# Patient Record
Sex: MALE | Race: WHITE | URBAN - METROPOLITAN AREA
[De-identification: names, ages, dates, MRNs, and addresses within clinical notes are randomized per-mention and may not be internally consistent; named-entity substitution may affect disease eponyms.]

---

## 2018-07-20 ENCOUNTER — OFFICE VISIT (OUTPATIENT)
Dept: URGENT CARE | Facility: URGENT CARE | Age: 53
End: 2018-07-20
Payer: COMMERCIAL

## 2018-07-20 VITALS
OXYGEN SATURATION: 98 % | WEIGHT: 219.9 LBS | TEMPERATURE: 98 F | DIASTOLIC BLOOD PRESSURE: 78 MMHG | SYSTOLIC BLOOD PRESSURE: 122 MMHG | HEART RATE: 72 BPM

## 2018-07-20 DIAGNOSIS — R07.89 CHEST DISCOMFORT: Primary | ICD-10-CM

## 2018-07-20 PROCEDURE — 99213 OFFICE O/P EST LOW 20 MIN: CPT | Performed by: PHYSICIAN ASSISTANT

## 2018-07-20 NOTE — PROGRESS NOTES
SUBJECTIVE:   Dar Ho is a 53 year old male presenting with a chief complaint of   Chief Complaint   Patient presents with     Urgent Care     Chest Pain     minor chest pain, history of pleurisy        He is a new patient of [facility].    He is presenting to urgent care today accompanied by his wife with complaint of chest discomfort for the past 2 week. Notes that he is very stressed at work. He took a few days off work and the pain subsided.  He denies any chest pain or pressure.  Notes he has a history of pleurisy.  Denies any palpitation.  Denies any URI symptoms.  No nausea.  No fevers or chills.  No headache.  No visual disturbances.  No numbness or tingling in the upper extremities.  No treatment tried prior to arrival.  He is not on any medication.  He denies any injury or trauma.  His wife wanted him to be checked today.      Review of Systems   Constitutional: Negative for chills, diaphoresis and fever.   HENT: Negative for congestion and sore throat.    Eyes: Negative for photophobia and visual disturbance.   Respiratory: Negative for cough and shortness of breath.    Cardiovascular: Negative for chest pain and palpitations.        Chest discomfort   Gastrointestinal: Negative for diarrhea, nausea and vomiting.   Allergic/Immunologic: Negative for immunocompromised state.   Neurological: Negative for dizziness, weakness and headaches.   Psychiatric/Behavioral: Negative for confusion.       History reviewed. No pertinent past medical history.  History reviewed. No pertinent family history.  No current outpatient prescriptions on file.     Social History   Substance Use Topics     Smoking status: Never Smoker     Smokeless tobacco: Never Used     Alcohol use Not on file       OBJECTIVE  /78  Pulse 72  Temp 98  F (36.7  C) (Oral)  Wt 219 lb 14.4 oz (99.7 kg)  SpO2 98%    Physical Exam   Constitutional: He is oriented to person, place, and time. He appears well-developed and well-nourished. No  distress.   HENT:   Head: Normocephalic and atraumatic.   Right Ear: Tympanic membrane and external ear normal.   Left Ear: Tympanic membrane and external ear normal.   Mouth/Throat: Oropharynx is clear and moist.   Eyes: Conjunctivae and EOM are normal. Pupils are equal, round, and reactive to light.   Neck: Normal range of motion. Neck supple.   Cardiovascular: Normal rate, regular rhythm and normal heart sounds.    No murmur heard.  Pulmonary/Chest: Effort normal and breath sounds normal. No respiratory distress.   Musculoskeletal: Normal range of motion.   Neurological: He is alert and oriented to person, place, and time.   Skin: Skin is warm and dry. He is not diaphoretic.   Psychiatric: He has a normal mood and affect.   Nursing note and vitals reviewed.      Labs:  No results found for this or any previous visit (from the past 24 hour(s)).    X-Ray was not done.    ASSESSMENT:      ICD-10-CM    1. Chest discomfort R07.89         Medical Decision Making:    Differential Diagnosis:  Chest discomfort, chest wall pain    Serious Comorbid Conditions:  Adult:  None    PLAN:  Chest discomfort: I recommended an EKG.  Patient and his wife declined.  No acute findings on exam.  Keep monitoring.  Anti-inflammatories as needed.  We discussed red flag symptoms and when to seek emergent care.  Patient and his wife understand and agree with the plan.      Followup:    If not improving or if condition worsens, follow up with your Primary Care Provider

## 2018-07-20 NOTE — MR AVS SNAPSHOT
"              After Visit Summary   2018    Dar Ho    MRN: [id number]           Patient Information     Date Of Birth          1965        Visit Information        Provider Department      2018 2:35 PM Janessa Castro PA-C [facility]        Today's Diagnoses     Chest discomfort    -  1       Follow-ups after your visit        Who to contact     If you have questions or need follow up information about today's clinic visit or your schedule please contact [facility] directly at [phone].  Normal or non-critical lab and imaging results will be communicated to you by [employer], letter or phone within 4 business days after the clinic has received the results. If you do not hear from us within 7 days, please contact the clinic through [employer] or phone. If you have a critical or abnormal lab result, we will notify you by phone as soon as possible.  Submit refill requests through [employer] or call your pharmacy and they will forward the refill request to us. Please allow 3 business days for your refill to be completed.          Additional Information About Your Visit        MyChart Information     [employer] lets you send messages to your doctor, view your test results, renew your prescriptions, schedule appointments and more. To sign up, go to [email or web address] . Click on \"Log in\" on the left side of the screen, which will take you to the Welcome page. Then click on \"Sign up Now\" on the right side of the page.     You will be asked to enter the access code listed below, as well as some personal information. Please follow the directions to create your username and password.     Your access code is: [email or web address]  Expires: 10/19/2018  9:40 AM     Your access code will  in 90 days. If you need help or a new code, please call your [facility] clinic or [phone].        Care EveryWhere ID     This is your Care EveryWhere ID. This could be used by other " organizations to access your [facility] medical records  [id number]        Your Vitals Were     Pulse Temperature Pulse Oximetry             72 98  F (36.7  C) (Oral) 98%          Blood Pressure from Last 3 Encounters:   07/20/18 122/78    Weight from Last 3 Encounters:   07/20/18 219 lb 14.4 oz (99.7 kg)              Today, you had the following     No orders found for display       Primary Care Provider Office Phone # Fax #    Arpit Klein MD, [phone]       [facility] [address]  [facility] [address]        Equal Access to Services     [facility]: Hadii aad ku hadasho Soomaali, waaxda luqadaha, qaybta kaalmada adeegyada, waxay idiin hayaan adeeg veda calhoun . So [facility] [phone].    ATENCIÓN: Si habla español, tiene a cat disposición servicios gratuitos de asistencia lingüística. Llame al [phone].    We comply with applicable federal civil rights laws and [place] laws. We do not discriminate on the basis of race, color, national origin, age, disability, sex, sexual orientation, or gender identity.            Thank you!     Thank you for choosing [facility]  for your care. Our goal is always to provide you with excellent care. Hearing back from our patients is one way we can continue to improve our services. Please take a few minutes to complete the written survey that you may receive in the mail after your visit with us. Thank you!             Your Updated Medication List - Protect others around you: Learn how to safely use, store and throw away your medicines at www.disposemymeds.org.      Notice  As of 7/20/2018 11:59 PM    You have not been prescribed any medications.

## 2018-07-21 ASSESSMENT — ENCOUNTER SYMPTOMS
DIAPHORESIS: 0
FEVER: 0
WEAKNESS: 0
SHORTNESS OF BREATH: 0
PHOTOPHOBIA: 0
NAUSEA: 0
PALPITATIONS: 0
VOMITING: 0
SORE THROAT: 0
CHILLS: 0
COUGH: 0
HEADACHES: 0
CONFUSION: 0
DIARRHEA: 0
DIZZINESS: 0